# Patient Record
Sex: FEMALE | Race: WHITE | NOT HISPANIC OR LATINO | URBAN - METROPOLITAN AREA
[De-identification: names, ages, dates, MRNs, and addresses within clinical notes are randomized per-mention and may not be internally consistent; named-entity substitution may affect disease eponyms.]

---

## 2021-01-12 ENCOUNTER — TELEPHONE ENCOUNTER (OUTPATIENT)
Dept: URBAN - METROPOLITAN AREA SURGERY CENTER 8 | Facility: SURGERY CENTER | Age: 26
End: 2021-01-12

## 2021-01-12 ENCOUNTER — OFFICE VISIT (OUTPATIENT)
Dept: URBAN - METROPOLITAN AREA CLINIC 33 | Facility: CLINIC | Age: 26
End: 2021-01-12

## 2021-01-12 VITALS
SYSTOLIC BLOOD PRESSURE: 115 MMHG | WEIGHT: 174 LBS | HEART RATE: 65 BPM | BODY MASS INDEX: 27.97 KG/M2 | HEIGHT: 66 IN | OXYGEN SATURATION: 98 % | DIASTOLIC BLOOD PRESSURE: 75 MMHG

## 2021-01-12 RX ORDER — MULTIVITAMIN
1 TABLET TABLET ORAL ONCE A DAY
Qty: 30 | Status: ACTIVE | COMMUNITY

## 2021-01-12 RX ORDER — TRIAMCINOLONE ACETONIDE 55 UG/1
1 SPRAY IN EACH NOSTRIL SPRAY, METERED NASAL ONCE A DAY
Status: ACTIVE | COMMUNITY

## 2021-01-12 RX ORDER — LORATADINE 10 MG/1
1 TABLET TABLET ORAL ONCE A DAY
Qty: 30 | Status: ACTIVE | COMMUNITY

## 2021-01-12 RX ORDER — LEVONORGESTREL AND ETHINYL ESTRADIOL 150-30(84)
1 TABLET KIT ORAL ONCE A DAY
Qty: 91 | Status: ACTIVE | COMMUNITY

## 2021-01-12 RX ORDER — ALBUTEROL SULFATE 90 UG/1
1 PUFF AS NEEDED AEROSOL, METERED RESPIRATORY (INHALATION)
Status: ACTIVE | COMMUNITY

## 2021-01-12 NOTE — HPI-MIGRATED HPI
;   ;   ;     Heartburn : Admits occasional heartburn at night. Usually heartburn is gone by next morning. Denies any associated symptoms. ;   Abdominal Pain : Patient is a 25-year-old  female who presents today complaining of abdominal pain post prandial 2 months ago for a month. Pain was from right side to the middle part and lasts about 3 hours. Pain started in the RUQ and was a sharp pain and pain in epigastrium is more like burning pain sensation.   Exacerbating factors include fatty food. She tried Omeprazole with relief for first few days, but symptoms came back afterwards.   Also admits nausea along with abd pain. Once she vomits, pain started to alleviate and eventually went away. Also admits associated symptoms bloating, heartburn, decreased appetite, early satiety. Patient denies dysphagia, gas, rectal bleeding, melena, mucus, or blood in stool, or weight loss.  Symptoms recur once a year for a month since 7 years ago. She had similar symptoms 7 years ago and was told by a GI that she probably had IBS.   Patient never had colonoscopy or EGD. [facility] did not have imaging test. Patient's last blood work was on 11/18/2020 with remarkable results of WBC 12.1, Absolute Neutrophils 7829, Absolute eosinophils 569, Calcium 10.4, ALT 33 otherwise unremakable CBC and CMP. She was also tested for Helicobacter pylori and celiac disease panel with negative results. She was tested for food allergy with remarkabel results for sesame seed.;   Diarrhea : She currently has 6 loose bowel movements a day. Patient states that she always has loose stools for her entire life. Patient never had colonoscopy. She took abx 2 weeks ago for ear infection. She took probiotics when taking antibiotics without relief. Also admits fecal urgency. Patient denies fecal incontinence, gas, nocturnal diarrhea, rectal bleeding, melena, mucus, or blood in stool, or weight loss.;

## 2021-01-19 ENCOUNTER — TELEPHONE ENCOUNTER (OUTPATIENT)
Dept: URBAN - METROPOLITAN AREA CLINIC 35 | Facility: CLINIC | Age: 26
End: 2021-01-19

## 2021-01-20 ENCOUNTER — TELEPHONE ENCOUNTER (OUTPATIENT)
Dept: URBAN - METROPOLITAN AREA CLINIC 35 | Facility: CLINIC | Age: 26
End: 2021-01-20

## 2021-01-22 ENCOUNTER — OFFICE VISIT (OUTPATIENT)
Dept: URBAN - METROPOLITAN AREA SURGERY CENTER 8 | Facility: SURGERY CENTER | Age: 26
End: 2021-01-22

## 2021-02-09 ENCOUNTER — TELEPHONE ENCOUNTER (OUTPATIENT)
Dept: URBAN - METROPOLITAN AREA CLINIC 35 | Facility: CLINIC | Age: 26
End: 2021-02-09

## 2021-02-09 ENCOUNTER — DASHBOARD ENCOUNTERS (OUTPATIENT)
Age: 26
End: 2021-02-09

## 2021-02-09 ENCOUNTER — OFFICE VISIT (OUTPATIENT)
Dept: URBAN - METROPOLITAN AREA CLINIC 33 | Facility: CLINIC | Age: 26
End: 2021-02-09

## 2021-02-09 VITALS — HEIGHT: 66 IN | BODY MASS INDEX: 26.84 KG/M2 | WEIGHT: 167 LBS

## 2021-02-09 PROBLEM — 396331005 CELIAC DISEASE: Status: ACTIVE | Noted: 2021-02-09

## 2021-02-09 RX ORDER — LORATADINE 10 MG/1
1 TABLET TABLET ORAL ONCE A DAY
Qty: 30 | Status: ACTIVE | COMMUNITY

## 2021-02-09 RX ORDER — ALBUTEROL SULFATE 90 UG/1
1 PUFF AS NEEDED AEROSOL, METERED RESPIRATORY (INHALATION)
Status: ACTIVE | COMMUNITY

## 2021-02-09 RX ORDER — FAMOTIDINE 20 MG/1
1 TABLET AT BEDTIME AS NEEDED TABLET, FILM COATED ORAL ONCE A DAY
Qty: 30 | Status: ACTIVE | COMMUNITY

## 2021-02-09 RX ORDER — LEVONORGESTREL AND ETHINYL ESTRADIOL 150-30(84)
1 TABLET KIT ORAL ONCE A DAY
Qty: 91 | Status: ACTIVE | COMMUNITY

## 2021-02-09 RX ORDER — MULTIVITAMIN
1 TABLET TABLET ORAL ONCE A DAY
Qty: 30 | Status: ACTIVE | COMMUNITY

## 2021-02-09 RX ORDER — TRIAMCINOLONE ACETONIDE 55 UG/1
1 SPRAY IN EACH NOSTRIL SPRAY, METERED NASAL ONCE A DAY
Status: ACTIVE | COMMUNITY

## 2021-02-09 NOTE — HPI-MIGRATED HPI
;   ;   ;     Heartburn : EGD performed on 01/22/2021. Patient denies any complications after the procedure. She had ABD US on 01/12/2021. She is taking Pepcid daily.  Denies additional episodes of heartburn since last visit.   Last visit (01/12/2021)                   Admits occasional heartburn at night. Usually heartburn is gone by next morning. Denies any associated symptoms. ;   Abdominal Pain : Patient is a 25-year-old  female who presents today to follow up s/p EGD. EGD performed on 01/22/2021. Patient denies any complications after the procedure. She had ABD US on 01/12/2021.  Continues to admit RUQ sharp pain, epigastrium burning sensation, nausea. Patient admits that she was given a gastroparesis diet and admits to some relief of symptoms.  She states if she doesn't eat after 5pm she won't get nauseated or vomit.   Last visit (01/12/2021)                    Patient is a 25-year-old  female who presents today complaining of abdominal pain post prandial 2 months ago for a month. Pain was from right side to the middle part and lasts about 3 hours. Pain started in the RUQ and was a sharp pain and pain in epigastrium is more like burning pain sensation.   Exacerbating factors include fatty food. She tried Omeprazole with relief for first few days, but symptoms came back afterwards.   Also admits nausea along with abd pain. Once she vomits, pain started to alleviate and eventually went away. Also admits associated symptoms bloating, heartburn, decreased appetite, early satiety. Patient denies dysphagia, gas, rectal bleeding, melena, mucus, or blood in stool, or weight loss.  Symptoms recur once a year for a month since 7 years ago. She had similar symptoms 7 years ago and was told by a GI that she probably had IBS.   Patient never had colonoscopy or EGD. [facility] did not have imaging test. Patient's last blood work was on 11/18/2020 with remarkable results of WBC 12.1, Absolute Neutrophils 7829, Absolute eosinophils 569, Calcium 10.4, ALT 33 otherwise unremakable CBC and CMP. She was also tested for Helicobacter pylori and celiac disease panel with negative results. She was tested for food allergy with remarkabel results for sesame seed.;   Diarrhea : EGD performed on 01/22/2021. Patient denies any complications after the procedure. She had ABD US on 01/12/2021.  She currently has diarrhea 4-5 a day. Denies any OTC NASAID's to relieve her symptoms.  Denies new associated symptoms.  Last visit (01/12/2021)                   She currently has 6 loose bowel movements a day. Patient states that she always has loose stools for her entire life. Patient never had colonoscopy. She took abx 2 weeks ago for ear infection. She took probiotics when taking antibiotics without relief. Also admits fecal urgency. Patient denies fecal incontinence, gas, nocturnal diarrhea, rectal bleeding, melena, mucus, or blood in stool, or weight loss.;

## 2021-02-10 ENCOUNTER — OFFICE VISIT (OUTPATIENT)
Dept: URBAN - METROPOLITAN AREA CLINIC 35 | Facility: CLINIC | Age: 26
End: 2021-02-10

## 2021-02-22 LAB
IMMUNOGLOBULIN A: 109
INTERPRETATION: (no result)
TISSUE TRANSGLUTAMINASE$AB, IGA: 1

## 2021-02-23 ENCOUNTER — TELEPHONE ENCOUNTER (OUTPATIENT)
Dept: URBAN - METROPOLITAN AREA CLINIC 35 | Facility: CLINIC | Age: 26
End: 2021-02-23

## 2021-03-04 ENCOUNTER — TELEPHONE ENCOUNTER (OUTPATIENT)
Dept: URBAN - METROPOLITAN AREA CLINIC 35 | Facility: CLINIC | Age: 26
End: 2021-03-04

## 2021-03-05 ENCOUNTER — TELEPHONE ENCOUNTER (OUTPATIENT)
Dept: URBAN - METROPOLITAN AREA CLINIC 35 | Facility: CLINIC | Age: 26
End: 2021-03-05